# Patient Record
Sex: MALE | Race: WHITE | NOT HISPANIC OR LATINO | Employment: STUDENT | URBAN - METROPOLITAN AREA
[De-identification: names, ages, dates, MRNs, and addresses within clinical notes are randomized per-mention and may not be internally consistent; named-entity substitution may affect disease eponyms.]

---
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
## 2025-03-11 ENCOUNTER — OFFICE VISIT (OUTPATIENT)
Dept: NEUROLOGY | Facility: CLINIC | Age: 20
End: 2025-03-11
Payer: COMMERCIAL

## 2025-03-11 VITALS
DIASTOLIC BLOOD PRESSURE: 68 MMHG | BODY MASS INDEX: 17.75 KG/M2 | WEIGHT: 145.8 LBS | OXYGEN SATURATION: 99 % | SYSTOLIC BLOOD PRESSURE: 106 MMHG | HEART RATE: 93 BPM

## 2025-03-11 DIAGNOSIS — R25.1 TREMOR: Primary | ICD-10-CM

## 2025-03-11 PROCEDURE — 99213 OFFICE O/P EST LOW 20 MIN: CPT | Performed by: PSYCHIATRY & NEUROLOGY

## 2025-03-11 NOTE — PROGRESS NOTES
Notes by [facility]:  Mr Diaz is here today for a follow up appointment. He feels that he is about the same.      Subjective:     Patient ID: Giuseppe Diaz is a 20 y.o. male.    History of Present Illness  The following portions of the patient's history were reviewed and updated as appropriate: allergies, current medications, past family history, past medical history, past social history, past surgical history, and problem list.    Review of Systems   Neurological:  Positive for tremors.        Objective:    Neurological Exam   Motor exam reveals normal symmetric tone bulk and power throughout without drift or spasticity.  No rigidity or cogwheeling.  No bradykinesia.  He does have a very fine flexion extension tremor of the hands during movement and a little bit with sustained posture that appears a little bit worse on the left than on the right.     The sensory exam reveals normal and symmetric sensation to light touch, temperature, vibration throughout.     Coordination testing reveals accurate finger-nose-finger bilaterally with a mild fast frequency superimposed tremor, normal heel-to-shin bilaterally and normal rhythmic rapid alternating movements.  Romberg testing is negative.  Walks on a narrow base with excellent stride length and normal arm swing.     Tendon reflexes are 2+ and symmetric throughout including preserved ankle jerks bilaterally.  No ankle clonus.  Physical Exam    Assessment/Plan:     Diagnoses and all orders for this visit:    1. Tremor (Primary)     20-year-old young man with gradually progressive bilateral hand tremor. The characteristics of the tremor along with his family history are strongly suggestive of the diagnosis of essential tremor.  His thyroid panel was normal and I reassured him from that perspective.  The tremor has not changed and is not significantly impacting activities of daily living.  We are therefore avoiding pharmacologic intervention at this time we will see him back in 6  months to check his progress.